# Patient Record
Sex: FEMALE | Race: WHITE | Employment: FULL TIME | URBAN - METROPOLITAN AREA
[De-identification: names, ages, dates, MRNs, and addresses within clinical notes are randomized per-mention and may not be internally consistent; named-entity substitution may affect disease eponyms.]

---

## 2024-01-19 ENCOUNTER — OFFICE VISIT (OUTPATIENT)
Dept: ORTHOPEDIC SURGERY | Age: 59
End: 2024-01-19

## 2024-01-19 VITALS — HEIGHT: 67 IN | BODY MASS INDEX: 24.33 KG/M2 | WEIGHT: 155 LBS

## 2024-01-19 DIAGNOSIS — M76.72 PERONEAL TENDINITIS OF LEFT LOWER EXTREMITY: ICD-10-CM

## 2024-01-19 DIAGNOSIS — M25.572 ACUTE LEFT ANKLE PAIN: Primary | ICD-10-CM

## 2024-01-19 RX ORDER — PREDNISONE 5 MG/1
TABLET ORAL
Qty: 1 EACH | Refills: 2 | Status: SHIPPED | OUTPATIENT
Start: 2024-01-19

## 2024-01-19 NOTE — PROGRESS NOTES
Name: Ana Ortega  YOB: 1965  Gender: female  MRN: [id number]    CC: Left ankle pain    HPI:   December 2023: Left posterior lateral ankle pain: No trauma  01/19/2024: Initial visit: Left ankle pain    ROS/Meds/PSH/PMH/FH/SH: reviewed today    Tobacco:  reports that she has never smoked. She has never used smokeless tobacco.     Physical Examination:  Patient appears to be alert and oriented with acceptable appearance.  No obvious distress or SOB  CV: appears to have acceptable vascular color and capillary refill  Neuro: appears to have mostly intact light touch sensation   Skin: No gross soft tissue swelling  MS: Standing: Bunions: Gait full: SSHR with good inversion but posterior lateral ankle/hindfoot pain  Left = posterior lateral fibula/ankle pain consistent with peroneal tendons  Left = no Achilles pain; no medial pain; no plantar fascia or heel pain  Right = no reproducible Achilles or plantar fascia pain  Bilateral = full ankle/foot motion; 5/5 strength; no instability or crepitance    XR: Left: Standing AP lateral mortise ankle both AP oblique foot taken today with bunion; no evidence of collapse arthritis; os naviculare;  XR Impression:  As above      Reviewed Test/Records/Documents:   2016: Left foot pain: Treated by Dr. Noel  [id number]: I treated: Chronic bilateral plantar fasciitis suspected plantar fascia deficiency: Bunions: Bilateral plantar nerve neuropathy    Injection: No indication today    Assessment:    Left acute ankle pain: Suspected peroneal tendinitis    Plan:   The patient and I discussed the above assessment. We explored treatment options.     Regarding her chronic plantar fasciitis, she has no reproducible pain today but I recommend OOFOS shoes  Regarding her acute issue, I believe this represents peroneal tendinitis  Plan is immobilization and medication then reevaluation    Advanced medical imaging: Left ankle MRI scan: Assess peroneal tendon tenosynovitis first

## 2024-02-02 ENCOUNTER — OFFICE VISIT (OUTPATIENT)
Dept: ORTHOPEDIC SURGERY | Age: 59
End: 2024-02-02

## 2024-02-02 VITALS — WEIGHT: 155 LBS | HEIGHT: 67 IN | BODY MASS INDEX: 24.33 KG/M2

## 2024-02-02 DIAGNOSIS — M76.72 PERONEAL TENDINITIS OF LEFT LOWER EXTREMITY: Primary | ICD-10-CM

## 2024-02-02 RX ORDER — PREDNISONE 5 MG/1
TABLET ORAL
Qty: 1 EACH | Refills: 1 | Status: SHIPPED | OUTPATIENT
Start: 2024-02-02

## 2024-02-02 NOTE — PROGRESS NOTES
Name: Ana Ortega  YOB: 1965  Gender: female  MRN: [id number]    02/02/2024: She is doing really well.    HPI:   December 2023: Left posterior lateral ankle pain: No trauma  01/19/2024: Initial visit: Left ankle pain    ROS/Meds/PSH/PMH/FH/SH: reviewed today    Tobacco:  reports that she has never smoked. She has never used smokeless tobacco.     Physical Examination:  Patient appears to be alert and oriented with acceptable appearance.  No obvious distress or SOB  CV: appears to have acceptable vascular color and capillary refill  Neuro: appears to have mostly intact light touch sensation   Skin: Left = no peroneal or ankle/hindfoot soft tissue swelling  MS: Standing: Bunions: Gait full    Left = resolved posterior lateral fibula/ankle pain   Left = no Achilles pain; no ankle pain   Left = full ankle/foot motion; 5/5 strength; no instability or crepitance    XR: Left: Standing AP lateral mortise ankle both AP oblique foot taken 01/19/2024 with bunion; no evidence of collapse arthritis; os naviculare;  XR Impression:  As above      Reviewed Test/Records/Documents:   2016: Left foot pain: Treated by Dr. Noel  [id number]: I treated: Chronic bilateral plantar fasciitis suspected plantar fascia deficiency: Bunions: Bilateral plantar nerve neuropathy    Injection: No indication today    Assessment:    Left acute ankle pain: Resolved peroneal tendinitis    Plan:   The patient and I discussed the above assessment. We explored treatment options.     Regarding her acute issue, she is doing really well with no reproducible pain  Regarding her chronic plantar fasciitis, she has no reproducible pain today      Advanced medical imaging: Left ankle MRI scan: no indication   We discussed ankle care and lace up ankle brace: Incredible wear ankle sleeve protection  PT: No indication today  Orthotic/prosthetic: Potential future but start with OOFOS       Medication - OTC meds prn: Recommend she continue prior

## 2025-06-19 ENCOUNTER — TRANSCRIBE ORDERS (OUTPATIENT)
Dept: SCHEDULING | Age: 60
End: 2025-06-19

## 2025-06-19 DIAGNOSIS — Z12.31 OTHER SCREENING MAMMOGRAM: Primary | ICD-10-CM

## 2025-07-21 ENCOUNTER — TELEPHONE (OUTPATIENT)
Dept: MAMMOGRAPHY | Age: 60
End: 2025-07-21

## 2025-07-21 NOTE — TELEPHONE ENCOUNTER
The patient called [facility] regarding her mammogram from 7-8-25 becausse it had not been dictated. I told her that we were waiting on priors from [facility] and have not received them to date. The radiologist will read her mammogram withour priors since the patient believes it has been over 10 years since her last mammogram. She was fine with that

## 2025-08-01 ENCOUNTER — HOSPITAL ENCOUNTER (OUTPATIENT)
Dept: MAMMOGRAPHY | Age: 60
End: 2025-08-01
Payer: OTHER GOVERNMENT

## 2025-08-01 ENCOUNTER — HOSPITAL ENCOUNTER (OUTPATIENT)
Dept: MAMMOGRAPHY | Age: 60
Discharge: HOME | End: 2025-08-01
Payer: OTHER GOVERNMENT

## 2025-08-01 DIAGNOSIS — R92.8 ABNORMAL SCREENING MAMMOGRAM: ICD-10-CM

## 2025-08-01 PROCEDURE — 76642 ULTRASOUND BREAST LIMITED: CPT

## 2025-08-01 PROCEDURE — G0279 TOMOSYNTHESIS, MAMMO: HCPCS
